# Patient Record
Sex: MALE | Race: WHITE | NOT HISPANIC OR LATINO | URBAN - NONMETROPOLITAN AREA
[De-identification: names, ages, dates, MRNs, and addresses within clinical notes are randomized per-mention and may not be internally consistent; named-entity substitution may affect disease eponyms.]

---

## 2023-06-29 ENCOUNTER — OFFICE VISIT (OUTPATIENT)
Dept: URBAN - NONMETROPOLITAN AREA CLINIC 13 | Facility: CLINIC | Age: 48
End: 2023-06-29

## 2023-09-25 ENCOUNTER — OFFICE VISIT (OUTPATIENT)
Dept: URBAN - NONMETROPOLITAN AREA CLINIC 13 | Facility: CLINIC | Age: 48
End: 2023-09-25

## 2023-10-13 ENCOUNTER — LAB OUTSIDE AN ENCOUNTER (OUTPATIENT)
Dept: URBAN - NONMETROPOLITAN AREA CLINIC 13 | Facility: CLINIC | Age: 48
End: 2023-10-13

## 2023-10-13 ENCOUNTER — OFFICE VISIT (OUTPATIENT)
Dept: URBAN - NONMETROPOLITAN AREA CLINIC 13 | Facility: CLINIC | Age: 48
End: 2023-10-13
Payer: COMMERCIAL

## 2023-10-13 ENCOUNTER — DASHBOARD ENCOUNTERS (OUTPATIENT)
Age: 48
End: 2023-10-13

## 2023-10-13 VITALS
HEIGHT: 66 IN | WEIGHT: 178 LBS | DIASTOLIC BLOOD PRESSURE: 79 MMHG | RESPIRATION RATE: 73 BRPM | OXYGEN SATURATION: 94 % | BODY MASS INDEX: 28.61 KG/M2 | SYSTOLIC BLOOD PRESSURE: 114 MMHG

## 2023-10-13 DIAGNOSIS — Z12.11 COLON CANCER SCREENING: ICD-10-CM

## 2023-10-13 DIAGNOSIS — Z79.01 ANTICOAGULANT LONG-TERM USE: ICD-10-CM

## 2023-10-13 PROBLEM — 711150003: Status: ACTIVE | Noted: 2023-10-13

## 2023-10-13 PROCEDURE — 99203 OFFICE O/P NEW LOW 30 MIN: CPT | Performed by: NURSE PRACTITIONER

## 2023-10-13 RX ORDER — ROSUVASTATIN CALCIUM 10 MG/1
TAKE ONE TABLET BY MOUTH ONE TIME DAILY TABLET, FILM COATED ORAL
Qty: 90 UNSPECIFIED | Refills: 1 | Status: ACTIVE | COMMUNITY

## 2023-10-13 RX ORDER — MONTELUKAST SODIUM 10 MG/1
TAKE ONE TABLET BY MOUTH AT BEDTIME TABLET, COATED ORAL
Qty: 30 UNSPECIFIED | Refills: 1 | Status: ACTIVE | COMMUNITY

## 2023-10-13 RX ORDER — UMECLIDINIUM BROMIDE AND VILANTEROL TRIFENATATE 62.5; 25 UG/1; UG/1
INHALE ONE PUFF BY MOUTH ONE TIME DAILY POWDER RESPIRATORY (INHALATION)
Qty: 60 UNSPECIFIED | Refills: 3 | Status: ACTIVE | COMMUNITY

## 2023-10-13 RX ORDER — ONDANSETRON HYDROCHLORIDE 4 MG/1
TAKE ONE TABLET BY MOUTH EVERY 6 HOURS AS NEEDED FOR NAUSEA AND VOMITING TABLET, FILM COATED ORAL
Qty: 4 UNSPECIFIED | Refills: 0 | Status: ACTIVE | COMMUNITY

## 2023-10-13 RX ORDER — AMLODIPINE BESYLATE 10 MG/1
TAKE ONE TABLET BY MOUTH ONE TIME DAILY TABLET ORAL
Qty: 90 UNSPECIFIED | Refills: 0 | Status: ACTIVE | COMMUNITY

## 2023-10-13 RX ORDER — ZIPRASIDONE HYDROCHLORIDE 20 MG/1
TAKE ONE CAPSULE BY MOUTH AT BEDTIME WITH A MEAL CAPSULE ORAL
Qty: 30 UNSPECIFIED | Refills: 0 | Status: ACTIVE | COMMUNITY

## 2023-10-13 RX ORDER — UMECLIDINIUM BROMIDE AND VILANTEROL TRIFENATATE 62.5; 25 UG/1; UG/1
POWDER RESPIRATORY (INHALATION)
Qty: 60 EACH | Status: ACTIVE | COMMUNITY

## 2023-10-13 RX ORDER — CLOPIDOGREL 75 MG/1
1 TABLET TABLET, FILM COATED ORAL ONCE A DAY
Status: ACTIVE | COMMUNITY

## 2023-10-13 RX ORDER — TESTOSTERONE 20.25 MG/1.25G
APPLY TWO PACKETS EVERY MORNING AS DIRECTED; WASH HANDS THOROUGHLY AFTER APPLICATION GEL TOPICAL
Qty: 75 UNSPECIFIED | Refills: 3 | Status: ACTIVE | COMMUNITY

## 2023-10-13 RX ORDER — GABAPENTIN 300 MG/1
TAKE ONE CAPSULE BY MOUTH FOUR TIMES A DAY CAPSULE ORAL
Qty: 120 UNSPECIFIED | Refills: 0 | Status: ACTIVE | COMMUNITY

## 2023-10-13 RX ORDER — PANTOPRAZOLE SODIUM 40 MG/1
TAKE ONE TABLET BY MOUTH ONE TIME DAILY TABLET, DELAYED RELEASE ORAL
Qty: 30 UNSPECIFIED | Refills: 3 | Status: ACTIVE | COMMUNITY

## 2023-10-13 RX ORDER — BUPROPION HYDROCHLORIDE 75 MG/1
TAKE ONE-HALF TABLET BY MOUTH ONE TIME DAILY TABLET, FILM COATED ORAL
Qty: 30 UNSPECIFIED | Refills: 0 | Status: ACTIVE | COMMUNITY

## 2023-10-13 RX ORDER — LAMOTRIGINE 100 MG/1
TAKE ONE TABLET BY MOUTH ONE TIME DAILY TABLET ORAL
Qty: 60 UNSPECIFIED | Refills: 0 | Status: ACTIVE | COMMUNITY

## 2023-10-13 RX ORDER — AMLODIPINE BESYLATE 10 MG/1
TABLET ORAL
Qty: 90 TABLET | Status: ACTIVE | COMMUNITY

## 2023-10-13 RX ORDER — SERTRALINE 50 MG/1
TAKE ONE TABLET BY MOUTH ONE TIME DAILY FOR 7 DAYS THEN TAKE TWO TABLETS BY MOUTH ONE TIME DAILY TABLET, FILM COATED ORAL
Qty: 60 UNSPECIFIED | Refills: 0 | Status: ACTIVE | COMMUNITY

## 2023-10-13 RX ORDER — CYCLOBENZAPRINE HYDROCHLORIDE 10 MG/1
TAKE ONE TABLET BY MOUTH THREE TIMES A DAY FOR 10 DAYS AS NEEDED FOR MUSCLE SPASM TABLET, FILM COATED ORAL
Qty: 30 UNSPECIFIED | Refills: 1 | Status: ACTIVE | COMMUNITY

## 2023-10-13 RX ORDER — NADOLOL 40 MG/1
TAKE ONE TABLET BY MOUTH ONE TIME DAILY TABLET ORAL
Qty: 90 UNSPECIFIED | Refills: 3 | Status: ACTIVE | COMMUNITY

## 2023-10-13 NOTE — HPI-TODAY'S VISIT:
10/13/2023 Mr. Brad Arndt is presents for evaluation of colorectal cancer screening. They deny any significant GI complaints. he has one loose stool in the morning.  There is no report of rectal bleeding. He will see a small amount of blood when he wipes at times but this is rare. They do not report any issues with anemia. They have never had a colonoscopy in the past. He has a hx of COPD and followed by Pulm with no recent issues. He has a hx of MI with stents in 2013. He remains on plavix and ASA but no longer sees a cardiologist. Otherwise the patient is healthy, risks and benefits were discussed and they agree to pursue colon cancer screening with colonoscopy. JAMAL

## 2024-01-30 ENCOUNTER — OFFICE VISIT (OUTPATIENT)
Dept: URBAN - NONMETROPOLITAN AREA SURGERY CENTER 1 | Facility: SURGERY CENTER | Age: 49
End: 2024-01-30

## 2024-03-27 ENCOUNTER — COLON (OUTPATIENT)
Dept: URBAN - NONMETROPOLITAN AREA SURGERY CENTER 1 | Facility: SURGERY CENTER | Age: 49
End: 2024-03-27